# Patient Record
Sex: FEMALE | Race: BLACK OR AFRICAN AMERICAN | Employment: OTHER | URBAN - METROPOLITAN AREA
[De-identification: names, ages, dates, MRNs, and addresses within clinical notes are randomized per-mention and may not be internally consistent; named-entity substitution may affect disease eponyms.]

---

## 2024-09-26 ENCOUNTER — OFFICE VISIT (OUTPATIENT)
Dept: NEUROSURGERY | Age: 19
End: 2024-09-26
Payer: COMMERCIAL

## 2024-09-26 VITALS — SYSTOLIC BLOOD PRESSURE: 140 MMHG | HEART RATE: 79 BPM | DIASTOLIC BLOOD PRESSURE: 76 MMHG | WEIGHT: 176 LBS

## 2024-09-26 DIAGNOSIS — S06.5X9S TRAUMATIC SUBDURAL HEMATOMA WITH LOSS OF CONSCIOUSNESS, SEQUELA: ICD-10-CM

## 2024-09-26 DIAGNOSIS — S06.9X9S TRAUMATIC BRAIN INJURY WITH LOSS OF CONSCIOUSNESS, SEQUELA: ICD-10-CM

## 2024-09-26 DIAGNOSIS — S12.100D CLOSED DISPLACED FRACTURE OF SECOND CERVICAL VERTEBRA WITH ROUTINE HEALING, UNSPECIFIED FRACTURE MORPHOLOGY, SUBSEQUENT ENCOUNTER: Primary | ICD-10-CM

## 2024-09-26 PROCEDURE — 99204 OFFICE O/P NEW MOD 45 MIN: CPT | Performed by: NURSE PRACTITIONER

## 2024-09-26 RX ORDER — ALBUTEROL SULFATE 1.25 MG/3ML
1 SOLUTION RESPIRATORY (INHALATION) EVERY 6 HOURS PRN
COMMUNITY

## 2024-09-26 RX ORDER — GUAIFENESIN 600 MG/1
1200 TABLET, EXTENDED RELEASE ORAL DAILY
COMMUNITY

## 2024-09-26 RX ORDER — ENOXAPARIN SODIUM 300 MG/3ML
1 INJECTION INTRAVENOUS; SUBCUTANEOUS DAILY
COMMUNITY

## 2024-09-26 RX ORDER — PREDNISONE 20 MG/1
20 TABLET ORAL DAILY
COMMUNITY

## 2024-09-26 RX ORDER — SCOLOPAMINE TRANSDERMAL SYSTEM 1 MG/1
1 PATCH, EXTENDED RELEASE TRANSDERMAL
COMMUNITY

## 2024-09-26 RX ORDER — ACETAMINOPHEN 500 MG
500 TABLET ORAL EVERY 6 HOURS PRN
COMMUNITY

## 2024-09-26 RX ORDER — METOPROLOL TARTRATE 50 MG
25 TABLET ORAL 2 TIMES DAILY
COMMUNITY

## 2024-09-26 RX ORDER — BACLOFEN 15 MG/1
1 TABLET ORAL DAILY
COMMUNITY

## 2024-09-26 RX ORDER — IPRATROPIUM BROMIDE AND ALBUTEROL SULFATE 2.5; .5 MG/3ML; MG/3ML
1 SOLUTION RESPIRATORY (INHALATION) EVERY 4 HOURS
COMMUNITY

## 2024-09-26 RX ORDER — LEVETIRACETAM 100 MG/ML
5 SOLUTION ORAL 2 TIMES DAILY
COMMUNITY

## 2024-10-21 ENCOUNTER — TELEMEDICINE (OUTPATIENT)
Dept: NEUROSURGERY | Age: 19
End: 2024-10-21
Payer: COMMERCIAL

## 2024-10-21 DIAGNOSIS — S06.9X9S TRAUMATIC BRAIN INJURY WITH LOSS OF CONSCIOUSNESS, SEQUELA: ICD-10-CM

## 2024-10-21 DIAGNOSIS — G91.3 POST-TRAUMATIC HYDROCEPHALUS (HCC): ICD-10-CM

## 2024-10-21 DIAGNOSIS — S06.5X9S TRAUMATIC SUBDURAL HEMATOMA WITH LOSS OF CONSCIOUSNESS, SEQUELA: ICD-10-CM

## 2024-10-21 DIAGNOSIS — S12.100D CLOSED DISPLACED FRACTURE OF SECOND CERVICAL VERTEBRA WITH ROUTINE HEALING, UNSPECIFIED FRACTURE MORPHOLOGY, SUBSEQUENT ENCOUNTER: Primary | ICD-10-CM

## 2024-10-21 PROCEDURE — 99214 OFFICE O/P EST MOD 30 MIN: CPT | Performed by: NURSE PRACTITIONER

## 2024-10-21 NOTE — PROGRESS NOTES
a negative item  -- DELETE ALL ITEMS NOT EXAMINED]  Vital Signs: (As obtained by patient/caregiver at home)    Patient visible lying in bed  Occasional spontaneous eye opening, no commands  Withdraws to pain in bilateral lower and left upper extremites    Due to this being a TeleHealth encounter, evaluation of the following organ systems is limited: Vitals/Constitutional/EENT/Resp/CV/GI//MS/Neuro/Skin/Heme-Lymph-Imm.    ASSESSMENT/PLAN:   Diagnosis Orders   1. Closed displaced fracture of second cervical vertebra with routine healing, unspecified fracture morphology, subsequent encounter        2. Traumatic subdural hematoma with loss of consciousness, sequela        3. Traumatic brain injury with loss of consciousness, sequela        4. Post-traumatic hydrocephalus (HCC)            CT imaging reviewed with rehab facility staff, does have fairly significant right sided ventriculomegaly.  Will continue to work on obtaining outside images for comparison.  Will have patient follow-up virtually in the next couple of weeks with surgeon for evaluation of any need for  shunt.    Return in about 1 week (around 10/28/2024), or if symptoms worsen or fail to improve.    An  electronic signature was used to authenticate this note.    --Jessica Davies, RIMA - CNP on 10/24/2024 at 10:53 AM    Sriram Crane, was evaluated through a synchronous (real-time) audio-video encounter. The patient (or guardian if applicable) is aware that this is a billable service, which includes applicable co-pays. This Virtual Visit was conducted with patient's (and/or legal guardian's) consent. Patient identification was verified, and a caregiver was present when appropriate.   The patient was located at Other: [facility], [address] [place] [id number]   Provider was located at Facility (Appt Dept): [address] [facility] [address]  [address] Ground Floor, [facility]  [place] [address]  Confirm you are appropriately

## 2024-11-27 NOTE — PROGRESS NOTES
[facility] Neurosurgery Telehealth Followup Visit    Pt Name: Sriram Crane  MRN: [id number]  YOB: 2005  Date of evaluation: 2024  Primary Care Physician: Dianne Vilchis MD  Reason for Evaluation: TELEHEALTH EVALUATION -- Audio/Visual follow-up    TELEHEALTH EVALUATION -- Audio/Visual  HPI:    Sriram Crane (:  2005) has requested an audio/video evaluation for the following concern(s):    Closed nondisplaced fracture of C2, traumatic subdural hematoma with loss of consciousness, and post-traumatic hydrocephalus.    This is my first time evaluating this patient  She lives [facility], she has been here since .  She was transferred from the [facility] to [place] after traumatic brain injury  She is largely nonverbal and fully dependent.    Prior to Visit Medications    Medication Sig Taking? Authorizing Provider   albuterol (ACCUNEB) 1.25 MG/3ML nebulizer solution Inhale 3 mLs into the lungs every 6 hours as needed for Wheezing  Tolu Parmar MD   Baclofen 15 MG TABS Take 1 tablet by mouth daily  Tolu Parmar MD   Docusate Sodium (COLACE CLEAR PO) Take 10 mLs by mouth daily  Tolu Parmar MD   enoxaparin (LOVENOX) 300 MG/3ML injection Inject 1 mg/kg into the skin daily  Tolu Parmar MD   ipratropium 0.5 mg-albuterol 2.5 mg (DUONEB) 0.5-2.5 (3) MG/3ML SOLN nebulizer solution Inhale 3 mLs into the lungs every 4 hours  Tolu Parmar MD   guaiFENesin (MUCINEX) 600 MG extended release tablet Take 2 tablets by mouth daily  Tolu Parmar MD   metoprolol tartrate (LOPRESSOR) 50 MG tablet Take 0.5 tablets by mouth 2 times daily  Tolu Parmar MD   predniSONE (DELTASONE) 20 MG tablet Take 1 tablet by mouth daily  Tolu Parmar MD   levETIRAcetam (KEPPRA) 100 MG/ML oral solution Take 5 mLs by mouth 2 times daily  Tolu Parmar MD   scopolamine (TRANSDERM-SCOP) transdermal patch Place 1 patch onto the skin

## 2024-12-04 ENCOUNTER — TELEMEDICINE (OUTPATIENT)
Dept: NEUROSURGERY | Age: 19
End: 2024-12-04
Payer: COMMERCIAL

## 2024-12-04 DIAGNOSIS — G91.3 POST-TRAUMATIC HYDROCEPHALUS (HCC): ICD-10-CM

## 2024-12-04 DIAGNOSIS — S06.5X9S TRAUMATIC SUBDURAL HEMATOMA WITH LOSS OF CONSCIOUSNESS, SEQUELA: ICD-10-CM

## 2024-12-04 DIAGNOSIS — S12.100D CLOSED DISPLACED FRACTURE OF SECOND CERVICAL VERTEBRA WITH ROUTINE HEALING, UNSPECIFIED FRACTURE MORPHOLOGY, SUBSEQUENT ENCOUNTER: Primary | ICD-10-CM

## 2024-12-04 PROCEDURE — 99213 OFFICE O/P EST LOW 20 MIN: CPT | Performed by: NEUROLOGICAL SURGERY

## 2024-12-09 PROBLEM — S12.100D CLOSED DISPLACED FRACTURE OF SECOND CERVICAL VERTEBRA WITH ROUTINE HEALING: Status: ACTIVE | Noted: 2024-12-09

## 2024-12-09 PROBLEM — G91.3 POST-TRAUMATIC HYDROCEPHALUS (HCC): Status: ACTIVE | Noted: 2024-12-09

## 2024-12-09 PROBLEM — S06.5X9A TRAUMATIC SUBDURAL HEMATOMA WITH LOSS OF CONSCIOUSNESS: Status: ACTIVE | Noted: 2024-12-09
